# Patient Record
Sex: MALE | Race: BLACK OR AFRICAN AMERICAN
[De-identification: names, ages, dates, MRNs, and addresses within clinical notes are randomized per-mention and may not be internally consistent; named-entity substitution may affect disease eponyms.]

---

## 2019-09-17 PROBLEM — Z00.00 ENCOUNTER FOR PREVENTIVE HEALTH EXAMINATION: Status: ACTIVE | Noted: 2019-09-17

## 2019-10-01 ENCOUNTER — LABORATORY RESULT (OUTPATIENT)
Age: 59
End: 2019-10-01

## 2019-10-01 ENCOUNTER — APPOINTMENT (OUTPATIENT)
Dept: NEUROLOGY | Facility: CLINIC | Age: 59
End: 2019-10-01
Payer: MEDICAID

## 2019-10-01 VITALS
OXYGEN SATURATION: 97 % | WEIGHT: 206 LBS | TEMPERATURE: 99 F | HEART RATE: 80 BPM | SYSTOLIC BLOOD PRESSURE: 120 MMHG | RESPIRATION RATE: 18 BRPM | DIASTOLIC BLOOD PRESSURE: 76 MMHG

## 2019-10-01 DIAGNOSIS — Z86.39 PERSONAL HISTORY OF OTHER ENDOCRINE, NUTRITIONAL AND METABOLIC DISEASE: ICD-10-CM

## 2019-10-01 DIAGNOSIS — Z78.9 OTHER SPECIFIED HEALTH STATUS: ICD-10-CM

## 2019-10-01 DIAGNOSIS — Z80.49 FAMILY HISTORY OF MALIGNANT NEOPLASM OF OTHER GENITAL ORGANS: ICD-10-CM

## 2019-10-01 DIAGNOSIS — R20.2 PARESTHESIA OF SKIN: ICD-10-CM

## 2019-10-01 DIAGNOSIS — Z80.3 FAMILY HISTORY OF MALIGNANT NEOPLASM OF BREAST: ICD-10-CM

## 2019-10-01 PROCEDURE — 99204 OFFICE O/P NEW MOD 45 MIN: CPT

## 2019-10-01 RX ORDER — NAPROXEN 500 MG/1
500 TABLET ORAL
Refills: 0 | Status: ACTIVE | COMMUNITY

## 2019-10-01 RX ORDER — GABAPENTIN 300 MG/1
300 CAPSULE ORAL
Refills: 0 | Status: ACTIVE | COMMUNITY

## 2019-10-01 RX ORDER — ATORVASTATIN CALCIUM 40 MG/1
40 TABLET, FILM COATED ORAL
Refills: 0 | Status: ACTIVE | COMMUNITY

## 2019-10-01 NOTE — CONSULT LETTER
[Dear  ___] : Dear  [unfilled], [FreeTextEntry1] : \par \par Thank you very much for allowing me to participate in the care of your patient. Please don't hesitate to contact me with any questions or concerns. \par \par Sincerely,\par Fortino Mccray MD\par Neurology\par [facility]\par [address]\[place] [address]\par Tel: [phone]\par Email: [email or web address]\par \par

## 2019-10-01 NOTE — DATA REVIEWED
[de-identified] : MRI l spine, emg, tsh 0.68, ck 104, hep c neg, eron neg, rf, neg, rpr neg hga1c neg, vit d low. \par MRA of lower ext: mild athero changes. MRI of brain: nonspecific wmid. \par cbc showed no elevation in eosinophilia.

## 2019-10-01 NOTE — PHYSICAL EXAM
[General Appearance - Alert] : alert [Oriented To Time, Place, And Person] : oriented to person, place, and time [Person] : oriented to person [Place] : oriented to place [Time] : oriented to time [Short Term Intact] : short term memory intact [Span Intact] : the attention span was normal [Naming Objects] : no difficulty naming common objects [Fluency] : fluency intact [Current Events] : adequate knowledge of current events [Cranial Nerves Optic (II)] : visual acuity intact bilaterally,  visual fields full to confrontation, pupils equal round and reactive to light [Cranial Nerves Oculomotor (III)] : extraocular motion intact [Cranial Nerves Trigeminal (V)] : facial sensation intact symmetrically [Cranial Nerves Facial (VII)] : face symmetrical [Cranial Nerves Vestibulocochlear (VIII)] : hearing was intact bilaterally [Cranial Nerves Glossopharyngeal (IX)] : tongue and palate midline [Cranial Nerves Accessory (XI - Cranial And Spinal)] : head turning and shoulder shrug symmetric [Cranial Nerves Hypoglossal (XII)] : there was no tongue deviation with protrusion [Motor Tone] : muscle tone was normal in all four extremities [Motor Strength] : muscle strength was normal in all four extremities [Coordination - Dysmetria Impaired Finger-to-Nose Bilateral] : not present [1+] : Patella left 1+ [FreeTextEntry7] : decreased to lt in the medial aspec of left ankle and anterior shin.  [Extraocular Movements] : extraocular movements were intact [Hearing Threshold Finger Rub Not Worth] : hearing was normal [Full Pulse] : the pedal pulses are present [Abnormal Walk] : normal gait [] : no rash

## 2019-10-01 NOTE — HISTORY OF PRESENT ILLNESS
[FreeTextEntry1] : 59 M who is here for initial consultation of left leg numbness, electric shock and tingling for the past 2 months. It's located on the medial aspect of his left shin and medial ankle. He has retired and goes back and forth between [place] and [facility]. He has seen a vascular surgeon and had ultrasound which was neg for blood clots. He then saw his first neurologist Dr. Mathis who sent him for MRI of l spine which showed multiple levels of bilateral severe foraminal narrowing. At L4-s1 there's large disc bulge that creates bilateral severe foraminal stenosis. There's no right sided symptoms to account for his findings. He also had EMG of his lower extremity and it showed decreased amplitude in the left peroneal when compared with the right peroneal motor nerves. It was still in normal range. He was started on gabapentin 300mg tid and it greatly improved his sensory changes and his ability to walk. He slowly increased the dose. No sedation at this time. He was sent for MRA of the leg by his primary physician and returned to the vascular physician who advised him that the findings were normal. \par He saw Dr. Jordan for his second opinion. He then sought acupuncture on his own. \par \par He states that there are similar symptoms in friends and family in [place]. They used local leaves as treatment for similar symptoms. Patient denies any fevers or insect bites.

## 2019-10-01 NOTE — DISCUSSION/SUMMARY
[FreeTextEntry1] : 59-year-old gentleman who is here for 2 month duration of left leg numbness that does not follow any dermatomal distribution. Will check MRI of the lumbar spine with and without contrast to evaluate for any inflammatory changes. Also check the rest of the neuropathy labs. Will also refer to infectious disease for any pathology that may be endemic to [place]. He will followup after the studies are completed.\par \par More than 50% of time spent on counseling and educate patient on differential, workup, disease course, and treatment/management. Education was provided to the patient during this encounter. All questions and concerns were answered and addressed in detail. The patient verbalized understanding and agreed to plan. Patient was advised to continue to monitor for neurologic symptoms and to notify my office or go to the nearest emergency room if there are any changes. \par Side effects of the above medications were discussed in detail including but not limited to applicable black box warning and teratogenicity as appropriate. \par Patient was advised to bring previous records to my office. \par \par

## 2019-10-04 ENCOUNTER — OTHER (OUTPATIENT)
Age: 59
End: 2019-10-04